# Patient Record
Sex: FEMALE | Race: WHITE | NOT HISPANIC OR LATINO | URBAN - METROPOLITAN AREA
[De-identification: names, ages, dates, MRNs, and addresses within clinical notes are randomized per-mention and may not be internally consistent; named-entity substitution may affect disease eponyms.]

---

## 2018-10-13 ENCOUNTER — INPATIENT (INPATIENT)
Facility: HOSPITAL | Age: 28
LOS: 0 days | Discharge: HOME | DRG: 833 | End: 2018-10-14
Attending: OBSTETRICS & GYNECOLOGY | Admitting: OBSTETRICS & GYNECOLOGY
Payer: COMMERCIAL

## 2018-10-13 VITALS
DIASTOLIC BLOOD PRESSURE: 74 MMHG | RESPIRATION RATE: 16 BRPM | HEART RATE: 82 BPM | SYSTOLIC BLOOD PRESSURE: 122 MMHG | TEMPERATURE: 98 F | OXYGEN SATURATION: 97 % | WEIGHT: 132.06 LBS

## 2018-10-13 LAB
ALBUMIN SERPL ELPH-MCNC: 4.7 G/DL — SIGNIFICANT CHANGE UP (ref 3.3–5)
ALP SERPL-CCNC: 39 U/L — LOW (ref 40–120)
ALT FLD-CCNC: 13 U/L — SIGNIFICANT CHANGE UP (ref 10–45)
ANION GAP SERPL CALC-SCNC: 14 MMOL/L — SIGNIFICANT CHANGE UP (ref 5–17)
APPEARANCE UR: CLEAR — SIGNIFICANT CHANGE UP
AST SERPL-CCNC: 17 U/L — SIGNIFICANT CHANGE UP (ref 10–40)
BASOPHILS NFR BLD AUTO: 0.3 % — SIGNIFICANT CHANGE UP (ref 0–2)
BILIRUB SERPL-MCNC: 0.5 MG/DL — SIGNIFICANT CHANGE UP (ref 0.2–1.2)
BILIRUB UR-MCNC: NEGATIVE — SIGNIFICANT CHANGE UP
BUN SERPL-MCNC: 8 MG/DL — SIGNIFICANT CHANGE UP (ref 7–23)
CALCIUM SERPL-MCNC: 9.8 MG/DL — SIGNIFICANT CHANGE UP (ref 8.4–10.5)
CHLORIDE SERPL-SCNC: 97 MMOL/L — SIGNIFICANT CHANGE UP (ref 96–108)
CO2 SERPL-SCNC: 25 MMOL/L — SIGNIFICANT CHANGE UP (ref 22–31)
COLOR SPEC: YELLOW — SIGNIFICANT CHANGE UP
CREAT SERPL-MCNC: 0.78 MG/DL — SIGNIFICANT CHANGE UP (ref 0.5–1.3)
DIFF PNL FLD: NEGATIVE — SIGNIFICANT CHANGE UP
EOSINOPHIL NFR BLD AUTO: 1 % — SIGNIFICANT CHANGE UP (ref 0–6)
GLUCOSE SERPL-MCNC: 86 MG/DL — SIGNIFICANT CHANGE UP (ref 70–99)
GLUCOSE UR QL: NEGATIVE — SIGNIFICANT CHANGE UP
HCT VFR BLD CALC: 37.9 % — SIGNIFICANT CHANGE UP (ref 34.5–45)
HGB BLD-MCNC: 13.6 G/DL — SIGNIFICANT CHANGE UP (ref 11.5–15.5)
KETONES UR-MCNC: 15 MG/DL
LEUKOCYTE ESTERASE UR-ACNC: ABNORMAL
LYMPHOCYTES # BLD AUTO: 19.6 % — SIGNIFICANT CHANGE UP (ref 13–44)
MCHC RBC-ENTMCNC: 30.4 PG — SIGNIFICANT CHANGE UP (ref 27–34)
MCHC RBC-ENTMCNC: 35.9 G/DL — SIGNIFICANT CHANGE UP (ref 32–36)
MCV RBC AUTO: 84.6 FL — SIGNIFICANT CHANGE UP (ref 80–100)
MONOCYTES NFR BLD AUTO: 7.9 % — SIGNIFICANT CHANGE UP (ref 2–14)
NEUTROPHILS NFR BLD AUTO: 71.2 % — SIGNIFICANT CHANGE UP (ref 43–77)
NITRITE UR-MCNC: NEGATIVE — SIGNIFICANT CHANGE UP
PH UR: 6 — SIGNIFICANT CHANGE UP (ref 5–8)
PLATELET # BLD AUTO: 205 K/UL — SIGNIFICANT CHANGE UP (ref 150–400)
POTASSIUM SERPL-MCNC: 3.7 MMOL/L — SIGNIFICANT CHANGE UP (ref 3.5–5.3)
POTASSIUM SERPL-SCNC: 3.7 MMOL/L — SIGNIFICANT CHANGE UP (ref 3.5–5.3)
PROT SERPL-MCNC: 6.9 G/DL — SIGNIFICANT CHANGE UP (ref 6–8.3)
PROT UR-MCNC: NEGATIVE MG/DL — SIGNIFICANT CHANGE UP
RBC # BLD: 4.48 M/UL — SIGNIFICANT CHANGE UP (ref 3.8–5.2)
RBC # FLD: 11.5 % — SIGNIFICANT CHANGE UP (ref 10.3–16.9)
SODIUM SERPL-SCNC: 136 MMOL/L — SIGNIFICANT CHANGE UP (ref 135–145)
SP GR SPEC: >=1.03 — SIGNIFICANT CHANGE UP (ref 1–1.03)
UROBILINOGEN FLD QL: 0.2 E.U./DL — SIGNIFICANT CHANGE UP
WBC # BLD: 6.9 K/UL — SIGNIFICANT CHANGE UP (ref 3.8–10.5)
WBC # FLD AUTO: 6.9 K/UL — SIGNIFICANT CHANGE UP (ref 3.8–10.5)

## 2018-10-13 PROCEDURE — 99285 EMERGENCY DEPT VISIT HI MDM: CPT

## 2018-10-13 RX ORDER — ONDANSETRON 8 MG/1
4 TABLET, FILM COATED ORAL ONCE
Qty: 0 | Refills: 0 | Status: COMPLETED | OUTPATIENT
Start: 2018-10-13 | End: 2018-10-13

## 2018-10-13 RX ORDER — METOCLOPRAMIDE HCL 10 MG
10 TABLET ORAL ONCE
Qty: 0 | Refills: 0 | Status: COMPLETED | OUTPATIENT
Start: 2018-10-13 | End: 2018-10-13

## 2018-10-13 RX ORDER — SODIUM CHLORIDE 9 MG/ML
1000 INJECTION, SOLUTION INTRAVENOUS
Qty: 0 | Refills: 0 | Status: DISCONTINUED | OUTPATIENT
Start: 2018-10-13 | End: 2018-10-13

## 2018-10-13 RX ORDER — ONDANSETRON 8 MG/1
8 TABLET, FILM COATED ORAL EVERY 12 HOURS
Qty: 0 | Refills: 0 | Status: DISCONTINUED | OUTPATIENT
Start: 2018-10-14 | End: 2018-10-14

## 2018-10-13 RX ORDER — FAMOTIDINE 10 MG/ML
20 INJECTION INTRAVENOUS ONCE
Qty: 0 | Refills: 0 | Status: COMPLETED | OUTPATIENT
Start: 2018-10-13 | End: 2018-10-13

## 2018-10-13 RX ORDER — INFLUENZA VIRUS VACCINE 15; 15; 15; 15 UG/.5ML; UG/.5ML; UG/.5ML; UG/.5ML
0.5 SUSPENSION INTRAMUSCULAR ONCE
Qty: 0 | Refills: 0 | Status: DISCONTINUED | OUTPATIENT
Start: 2018-10-13 | End: 2018-10-14

## 2018-10-13 RX ORDER — SODIUM CHLORIDE 9 MG/ML
1000 INJECTION INTRAMUSCULAR; INTRAVENOUS; SUBCUTANEOUS
Qty: 0 | Refills: 0 | Status: DISCONTINUED | OUTPATIENT
Start: 2018-10-13 | End: 2018-10-14

## 2018-10-13 RX ORDER — SIMETHICONE 80 MG/1
80 TABLET, CHEWABLE ORAL EVERY 8 HOURS
Qty: 0 | Refills: 0 | Status: DISCONTINUED | OUTPATIENT
Start: 2018-10-13 | End: 2018-10-14

## 2018-10-13 RX ORDER — ONDANSETRON 8 MG/1
8 TABLET, FILM COATED ORAL EVERY 12 HOURS
Qty: 0 | Refills: 0 | Status: DISCONTINUED | OUTPATIENT
Start: 2018-10-13 | End: 2018-10-13

## 2018-10-13 RX ORDER — SODIUM CHLORIDE 9 MG/ML
1000 INJECTION, SOLUTION INTRAVENOUS
Qty: 0 | Refills: 0 | Status: DISCONTINUED | OUTPATIENT
Start: 2018-10-13 | End: 2018-10-14

## 2018-10-13 RX ORDER — SODIUM CHLORIDE 9 MG/ML
1000 INJECTION INTRAMUSCULAR; INTRAVENOUS; SUBCUTANEOUS ONCE
Qty: 0 | Refills: 0 | Status: COMPLETED | OUTPATIENT
Start: 2018-10-13 | End: 2018-10-13

## 2018-10-13 RX ADMIN — SODIUM CHLORIDE 200 MILLILITER(S): 9 INJECTION, SOLUTION INTRAVENOUS at 21:53

## 2018-10-13 RX ADMIN — FAMOTIDINE 20 MILLIGRAM(S): 10 INJECTION INTRAVENOUS at 22:25

## 2018-10-13 RX ADMIN — SODIUM CHLORIDE 250 MILLILITER(S): 9 INJECTION, SOLUTION INTRAVENOUS at 12:42

## 2018-10-13 RX ADMIN — SODIUM CHLORIDE 125 MILLILITER(S): 9 INJECTION INTRAMUSCULAR; INTRAVENOUS; SUBCUTANEOUS at 15:05

## 2018-10-13 RX ADMIN — SODIUM CHLORIDE 1000 MILLILITER(S): 9 INJECTION INTRAMUSCULAR; INTRAVENOUS; SUBCUTANEOUS at 11:21

## 2018-10-13 RX ADMIN — SODIUM CHLORIDE 1000 MILLILITER(S): 9 INJECTION INTRAMUSCULAR; INTRAVENOUS; SUBCUTANEOUS at 12:42

## 2018-10-13 RX ADMIN — ONDANSETRON 4 MILLIGRAM(S): 8 TABLET, FILM COATED ORAL at 19:11

## 2018-10-13 RX ADMIN — Medication 12.5 MILLIGRAM(S): at 22:00

## 2018-10-13 RX ADMIN — Medication 10 MILLIGRAM(S): at 11:21

## 2018-10-13 NOTE — H&P ADULT - NSHPSOCIALHISTORY_GEN_ALL_CORE
Spoke with patient regarding results on Friday.  Informed her daughter, daughter would like to move appointment from 2/2/2017 to sooner.  Questioning if blockages found requires surgery or can it be treated with medications.  Informed I did not know, but Dr Simmons requested to see patient within 2 weeks due to findings. Daughter stated they are very concerned.  Offered appointments on 1/19 and 1/26, will speak with patient and call back to reschedule appointment.  During previous discussion with patient, she requested follow up be on 2/2/2017 due to chemo.     Patient works as a nurse; she lives with her  and dog Joaquim

## 2018-10-13 NOTE — CONSULT NOTE ADULT - SUBJECTIVE AND OBJECTIVE BOX
Patient evaluated at bedside.   Patient denies fever, chills, chest pain, SOB, abdominal pain, nausea, vomiting, vaginal bleeding      OBHx:   GYN Hx:  PMHx:   SHx:  Meds:   Allergies:     PHYSICAL EXAM:   Vital Signs Last 24 Hrs  T(C): 36.5 (13 Oct 2018 10:46), Max: 36.5 (13 Oct 2018 10:46)  T(F): 97.7 (13 Oct 2018 10:46), Max: 97.7 (13 Oct 2018 10:46)  HR: 82 (13 Oct 2018 10:46) (82 - 82)  BP: 122/74 (13 Oct 2018 10:46) (122/74 - 122/74)  BP(mean): --  RR: 16 (13 Oct 2018 10:46) (16 - 16)  SpO2: 97% (13 Oct 2018 10:46) (97% - 97%)    **************************  Constitutional: Alert & Oriented x3, No acute distress  Respiratory: Clear to ausculation bilaterally; no wheezing, rhonchi, or crackles  Cardiovascular: regular rate and rhythm, no murmurs, or gallops  Gastrointestinal: soft, non tender, positive bowel sounds, no rebound or guarding   Pelvic exam:   Extremities: no calf tenderness or swelling      LABS:                        13.6   6.9   )-----------( 205      ( 13 Oct 2018 11:29 )             37.9     10-13    136  |  97  |  8   ----------------------------<  86  3.7   |  25  |  0.78    Ca    9.8      13 Oct 2018 11:29    TPro  6.9  /  Alb  4.7  /  TBili  0.5  /  DBili  x   /  AST  17  /  ALT  13  /  AlkPhos  39<L>  10-13      Urinalysis Basic - ( 13 Oct 2018 11:29 )    Color: Yellow / Appearance: Clear / SG: >=1.030 / pH: x  Gluc: x / Ketone: 15 mg/dL  / Bili: Negative / Urobili: 0.2 E.U./dL   Blood: x / Protein: NEGATIVE mg/dL / Nitrite: NEGATIVE   Leuk Esterase: Moderate / RBC: < 5 /HPF / WBC > 10 /HPF   Sq Epi: x / Non Sq Epi: Many /HPF / Bacteria: Many /HPF          RADIOLOGY & ADDITIONAL STUDIES: 26 yo G1 at 7w2d (JONO 5/30/19 by dating US) presents for evaluation of vomiting and nausea. Patient has been followed since 4 weeks for hyperemesis gravidarum. Patient reports that she has not been able to keep any solid food down since Thursday. She reports persistent nausea and vomiting and last vomited this morning. She was evaluated in the ED at an outside hospital on Wednesday 10/10 for dehydration and given IV fluids. She endorses lightheadedness, dizziness and weakness. She denies vaginal bleeding, leaking of fluid, fever, chills, chest pain, SOB, and abdominal pain.     OBHx: Denies   GYN Hx: Denies  PMHx: Denies   SHx: ACL surgery, wisdom teeth extraction   Meds: Phenergan suppositories, zofran PO   Allergies: NKDA     PHYSICAL EXAM:   Vital Signs Last 24 Hrs  T(C): 36.5 (13 Oct 2018 10:46), Max: 36.5 (13 Oct 2018 10:46)  T(F): 97.7 (13 Oct 2018 10:46), Max: 97.7 (13 Oct 2018 10:46)  HR: 82 (13 Oct 2018 10:46) (82 - 82)  BP: 122/74 (13 Oct 2018 10:46) (122/74 - 122/74)  BP(mean): --  RR: 16 (13 Oct 2018 10:46) (16 - 16)  SpO2: 97% (13 Oct 2018 10:46) (97% - 97%)    **************************  Constitutional: Alert & Oriented x3, No acute distress  Gastrointestinal: soft, non tender, positive bowel sounds, no rebound or guarding   Extremities: no calf tenderness or swelling      LABS:                        13.6   6.9   )-----------( 205      ( 13 Oct 2018 11:29 )             37.9     10-13    136  |  97  |  8   ----------------------------<  86  3.7   |  25  |  0.78    Ca    9.8      13 Oct 2018 11:29    TPro  6.9  /  Alb  4.7  /  TBili  0.5  /  DBili  x   /  AST  17  /  ALT  13  /  AlkPhos  39<L>  10-13      Urinalysis Basic - ( 13 Oct 2018 11:29 )    Color: Yellow / Appearance: Clear / SG: >=1.030 / pH: x  Gluc: x / Ketone: 15 mg/dL  / Bili: Negative / Urobili: 0.2 E.U./dL   Blood: x / Protein: NEGATIVE mg/dL / Nitrite: NEGATIVE   Leuk Esterase: Moderate / RBC: < 5 /HPF / WBC > 10 /HPF   Sq Epi: x / Non Sq Epi: Many /HPF / Bacteria: Many /HPF          RADIOLOGY & ADDITIONAL STUDIES:

## 2018-10-13 NOTE — ED ADULT NURSE NOTE - NSIMPLEMENTINTERV_GEN_ALL_ED
Implemented All Universal Safety Interventions:  [place]ine to call system. Call bell, personal items and telephone within reach. Instruct patient to call for assistance. Room bathroom lighting operational. Non-slip footwear when patient is off stretcher. Physically safe environment: no spills, clutter or unnecessary equipment. Stretcher in lowest position, wheels locked, appropriate side rails in place.

## 2018-10-13 NOTE — H&P ADULT - NSHPLABSRESULTS_GEN_ALL_CORE
LABS:                        13.6   6.9   )-----------( 205      ( 13 Oct 2018 11:29 )             37.9     10-13    136  |  97  |  8   ----------------------------<  86  3.7   |  25  |  0.78    Ca    9.8      13 Oct 2018 11:29    TPro  6.9  /  Alb  4.7  /  TBili  0.5  /  DBili  x   /  AST  17  /  ALT  13  /  AlkPhos  39<L>  10-13      Urinalysis Basic - ( 13 Oct 2018 11:29 )    Color: Yellow / Appearance: Clear / SG: >=1.030 / pH: x  Gluc: x / Ketone: 15 mg/dL  / Bili: Negative / Urobili: 0.2 E.U./dL   Blood: x / Protein: NEGATIVE mg/dL / Nitrite: NEGATIVE   Leuk Esterase: Moderate / RBC: < 5 /HPF / WBC > 10 /HPF   Sq Epi: x / Non Sq Epi: Many /HPF / Bacteria: Many /HPF

## 2018-10-13 NOTE — H&P ADULT - NSHPPHYSICALEXAM_GEN_ALL_CORE
PHYSICAL EXAM:   Vital Signs Last 24 Hrs  T(C): 36.5 (13 Oct 2018 10:46), Max: 36.5 (13 Oct 2018 10:46)  T(F): 97.7 (13 Oct 2018 10:46), Max: 97.7 (13 Oct 2018 10:46)  HR: 82 (13 Oct 2018 10:46) (82 - 82)  BP: 122/74 (13 Oct 2018 10:46) (122/74 - 122/74)  BP(mean): --  RR: 16 (13 Oct 2018 10:46) (16 - 16)  SpO2: 97% (13 Oct 2018 10:46) (97% - 97%)    **************************  Constitutional: Alert & Oriented x3, No acute distress  Gastrointestinal: soft, non tender, positive bowel sounds, no rebound or guarding   Extremities: no calf tenderness or swelling

## 2018-10-13 NOTE — ED PROVIDER NOTE - MEDICAL DECISION MAKING DETAILS
26 y/o f  7 weeks pregnant with n/v; nontender abd, vss, no vag bleeding.  Will send labs, IV hydration and reglan.  Urine with ketones, will f/u electrolytes, reassess and discuss with GYN.

## 2018-10-13 NOTE — ED ADULT TRIAGE NOTE - CHIEF COMPLAINT QUOTE
c/o nausea, vomiting x 2 weeks.  7 wks pregnant ().  Dx w/ hyperemesis.  Denies abd pain, vaginal bleeding.

## 2018-10-13 NOTE — H&P ADULT - HISTORY OF PRESENT ILLNESS
28 yo G1 at 7w2d (JONO 5/30/19 by dating US) presents for evaluation of vomiting and nausea. Patient has been followed since 4 weeks for hyperemesis gravidarum. Patient reports that she has not been able to keep any solid food down since Thursday. She reports persistent nausea and vomiting and last vomited this morning. She was evaluated in the ED at an outside hospital on Wednesday 10/10 for dehydration and given IV fluids. She endorses lightheadedness, dizziness and weakness. She denies vaginal bleeding, leaking of fluid, fever, chills, chest pain, SOB, and abdominal pain.     OBHx: Denies   GYN Hx: PCOS with irregular periods   PMHx: Denies   SHx: ACL surgery, wisdom teeth extraction   Meds: Phenergan suppositories, zofran PO   Allergies: NKDA

## 2018-10-13 NOTE — H&P ADULT - ASSESSMENT
Assessment and Plan:   26 yo G1 at 7w2 with hyperemesis gravidarum presents for evaluation of persistent nausea and vomiting.   - Recommend IV fluids  - Iv anti-emetics PRN   - Continue phenergan suppositories  - Regular diet as tolerated   - Admit to antepartum service     Discussed with Dr. Shepherd

## 2018-10-13 NOTE — CONSULT NOTE ADULT - ASSESSMENT
Assessment and Plan:   28 yo G1 at 7w2 with hyperemesis gravidarum presents for evaluation of persistent nausea and vomiting.   - Recommend IV fluids  - Iv anti-emetics PRN   - Continue phenergan suppositories  - Regular diet as tolerated   - Admit to antepartum service     Discussed with Dr. Shepherd

## 2018-10-13 NOTE — ED PROVIDER NOTE - OBJECTIVE STATEMENT
28 y/o f  7 wks pregnant presents c/o n/v for the past few weeks, unable to tolerate PO.  Pt stating has been on diclegis followed by zofran and now phenergan suppositories without resolution of vomiting.  Pt stating was treated for UTI, took 2 days of abx, didn't take this morning as she was feeling so nauseous, having some urgency and burning.  Denies fever, chills, diarrhea, abd pain, vb, all other ROS negative.

## 2018-10-14 VITALS
SYSTOLIC BLOOD PRESSURE: 104 MMHG | TEMPERATURE: 97 F | DIASTOLIC BLOOD PRESSURE: 56 MMHG | RESPIRATION RATE: 14 BRPM | HEART RATE: 65 BPM | OXYGEN SATURATION: 100 %

## 2018-10-14 LAB
BLD GP AB SCN SERPL QL: NEGATIVE — SIGNIFICANT CHANGE UP
RH IG SCN BLD-IMP: NEGATIVE — SIGNIFICANT CHANGE UP

## 2018-10-14 PROCEDURE — 86850 RBC ANTIBODY SCREEN: CPT

## 2018-10-14 PROCEDURE — 86901 BLOOD TYPING SEROLOGIC RH(D): CPT

## 2018-10-14 PROCEDURE — 96361 HYDRATE IV INFUSION ADD-ON: CPT

## 2018-10-14 PROCEDURE — G0378: CPT

## 2018-10-14 PROCEDURE — 36415 COLL VENOUS BLD VENIPUNCTURE: CPT

## 2018-10-14 PROCEDURE — 85025 COMPLETE CBC W/AUTO DIFF WBC: CPT

## 2018-10-14 PROCEDURE — 87086 URINE CULTURE/COLONY COUNT: CPT

## 2018-10-14 PROCEDURE — 86900 BLOOD TYPING SEROLOGIC ABO: CPT

## 2018-10-14 PROCEDURE — 81001 URINALYSIS AUTO W/SCOPE: CPT

## 2018-10-14 PROCEDURE — 99285 EMERGENCY DEPT VISIT HI MDM: CPT | Mod: 25

## 2018-10-14 PROCEDURE — 80053 COMPREHEN METABOLIC PANEL: CPT

## 2018-10-14 PROCEDURE — 96374 THER/PROPH/DIAG INJ IV PUSH: CPT

## 2018-10-14 RX ORDER — ONDANSETRON 8 MG/1
4 TABLET, FILM COATED ORAL EVERY 8 HOURS
Qty: 0 | Refills: 0 | Status: DISCONTINUED | OUTPATIENT
Start: 2018-10-14 | End: 2018-10-14

## 2018-10-14 RX ORDER — METOCLOPRAMIDE HCL 10 MG
5 TABLET ORAL EVERY 6 HOURS
Qty: 0 | Refills: 0 | Status: DISCONTINUED | OUTPATIENT
Start: 2018-10-14 | End: 2018-10-14

## 2018-10-14 RX ADMIN — Medication 12.5 MILLIGRAM(S): at 11:16

## 2018-10-14 RX ADMIN — SODIUM CHLORIDE 200 MILLILITER(S): 9 INJECTION, SOLUTION INTRAVENOUS at 11:16

## 2018-10-14 RX ADMIN — ONDANSETRON 8 MILLIGRAM(S): 8 TABLET, FILM COATED ORAL at 07:00

## 2018-10-14 NOTE — DISCHARGE NOTE ADULT - PATIENT PORTAL LINK FT
You can access the [facility] Patient Portal, offered by [facility], by registering with the following website: [email or web address]

## 2018-10-14 NOTE — PROGRESS NOTE ADULT - SUBJECTIVE AND OBJECTIVE BOX
Patient evaluated at bedside. Patient reports no further episodes of vomiting since arriving to the hospital. She denies nausea at this time. She is voiding without issue. She denies vaginal bleeding, leaking of fluid, lightheadedness, shortness or breath or any other complaints. She does feel hungry and is looking forward to trying breakfast this morning.      Vital Signs Last 24 Hrs  T(C): 36.2 (14 Oct 2018 08:50), Max: 37.2 (13 Oct 2018 20:28)  T(F): 97.1 (14 Oct 2018 08:50), Max: 99 (13 Oct 2018 20:28)  HR: 65 (14 Oct 2018 08:50) (65 - 90)  BP: 104/56 (14 Oct 2018 08:50) (98/62 - 122/74)  BP(mean): --  ABP: --  ABP(mean): --  RR: 14 (14 Oct 2018 08:50) (14 - 18)  SpO2: 100% (14 Oct 2018 08:50) (97% - 100%)    PAST MEDICAL & SURGICAL HISTORY:  PCOS (polycystic ovarian syndrome)  No significant past surgical history    Physical Exam  Gen: Alert, NAD, resting comfortably with partner at bedside   Abdomen: Nontender, not distended, soft    MEDICATIONS  (STANDING):  influenza   Vaccine 0.5 milliLiter(s) IntraMuscular once  multivitamin/thiamine/folic acid in sodium chloride 0.9% 1000 milliLiter(s) (200 mL/Hr) IV Continuous <Continuous>  ondansetron Injectable 8 milliGRAM(s) IV Push every 12 hours  promethazine Suppository 12.5 milliGRAM(s) Rectal at bedtime  sodium chloride 0.9%. 1000 milliLiter(s) (125 mL/Hr) IV Continuous <Continuous>                            13.6   6.9   )-----------( 205      ( 13 Oct 2018 11:29 )             37.9     10-13    136  |  97  |  8   ----------------------------<  86  3.7   |  25  |  0.78    Ca    9.8      13 Oct 2018 11:29    TPro  6.9  /  Alb  4.7  /  TBili  0.5  /  DBili  x   /  AST  17  /  ALT  13  /  AlkPhos  39<L>  10-13      Radiology:    Consults:    Assessment:    Plan:

## 2018-10-14 NOTE — DISCHARGE NOTE ADULT - HOSPITAL COURSE
Patient was admitted for nausea and vomiting in first trimester. While in hospital medication regimen was adjusted to control nausea and vomiting was controlled. While in hospital she was able to tolerate food and liquids without nausea and vomiting. Patient is to continue at home the following regimen:  Diclegis-1 tablet in morning, 1 tablet in afternoon, two tablets at bedtime  Promethazine-12.5cm rectal suppository every 8hrs  Zofran-4mg every 6hrs as need for nausea   Should also continue with pepcid twice per day and tums for heartburn.   If nausea persists to call Dr. Shepherd office on 10/15 to have an order for Zofran pump. Patient was admitted for nausea and vomiting in first trimester. While in hospital medication regimen was adjusted to control nausea and vomiting was controlled. While in hospital she was able to tolerate food and liquids without nausea and vomiting. Patient is to continue at home the following regimen:  Diclegis-1 tablet in morning, 1 tablet in afternoon, two tablets at bedtime  Promethazine-12.5cm rectal suppository every 8hrs  Zofran-4mg every 6hrs as need for nausea   Should also continue with pepcid(20mg oral) twice per day and tums for heartburn.   If nausea persists to call Dr. Shepherd office on 10/15 to have an order for Zofran pump.

## 2018-10-14 NOTE — DISCHARGE NOTE ADULT - CARE PLAN
Principal Discharge DX:	Hyperemesis  Goal:	resolution nausea and vomiting, tolerate PO  Assessment and plan of treatment:	Patient was admitted for nausea and vomiting in first trimester. While in hospital medication regimen was adjusted to control nausea and vomiting was controlled. While in hospital she was able to tolerate food and liquids without nausea and vomiting. Patient is to continue at home the following regimen:  Diclegis-1 tablet in morning, 1 tablet in afternoon, two tablets at bedtime  Promethazine-12.5cm rectal suppository every 8hrs  Zofran-4mg every 6hrs as need for nausea   Should also continue with pepcid twice per day and tums for heartburn.   If nausea persists to call Dr. Shepherd office on 10/15 to have an order for Zofran pump.

## 2018-10-14 NOTE — PROGRESS NOTE ADULT - ASSESSMENT
Assessment and Plan: 28 yo G1 at 7w3 admitted with hyperemesis gravidarum.     Plan:  1. Vital signs stable, continue to monitor per protocol  2. Continue phenergan suppositories and IV Zofran ATC  3. IV fluids and 1 banana bag daily   4. Encourage regular diet as tolerated   5. Labs pending

## 2018-10-14 NOTE — PROGRESS NOTE ADULT - ATTENDING COMMENTS
Admitted for hyperemeiss s/p hydration. Tolerating PO. Plan to dc today. Patient has rx for meds. Will order zofran pump for home.

## 2018-10-14 NOTE — DISCHARGE NOTE ADULT - MEDICATION SUMMARY - MEDICATIONS TO TAKE
I will START or STAY ON the medications listed below when I get home from the hospital:    promethazine 25 mg rectal suppository  --  rectally   -- Indication: For Hyperemesis

## 2018-10-14 NOTE — DISCHARGE NOTE ADULT - CARE PROVIDER_API CALL
Elza Shepherd), Obstetrics and Gynecology  [facility] [place] [address]  Phone: [phone]  Fax: [phone]

## 2018-10-14 NOTE — DISCHARGE NOTE ADULT - PLAN OF CARE
resolution nausea and vomiting, tolerate PO Patient was admitted for nausea and vomiting in first trimester. While in hospital medication regimen was adjusted to control nausea and vomiting was controlled. While in hospital she was able to tolerate food and liquids without nausea and vomiting. Patient is to continue at home the following regimen:  Diclegis-1 tablet in morning, 1 tablet in afternoon, two tablets at bedtime  Promethazine-12.5cm rectal suppository every 8hrs  Zofran-4mg every 6hrs as need for nausea   Should also continue with pepcid twice per day and tums for heartburn.   If nausea persists to call Dr. Shepherd office on 10/15 to have an order for Zofran pump.

## 2018-10-15 LAB
-  GENTAMICIN: SIGNIFICANT CHANGE UP
-  PENICILLIN: SIGNIFICANT CHANGE UP
CULTURE RESULTS: SIGNIFICANT CHANGE UP
METHOD TYPE: SIGNIFICANT CHANGE UP
ORGANISM # SPEC MICROSCOPIC CNT: SIGNIFICANT CHANGE UP
ORGANISM # SPEC MICROSCOPIC CNT: SIGNIFICANT CHANGE UP
SPECIMEN SOURCE: SIGNIFICANT CHANGE UP

## 2018-10-17 DIAGNOSIS — R12 HEARTBURN: ICD-10-CM

## 2018-10-17 DIAGNOSIS — Z34.91 ENCOUNTER FOR SUPERVISION OF NORMAL PREGNANCY, UNSPECIFIED, FIRST TRIMESTER: ICD-10-CM

## 2018-10-17 DIAGNOSIS — Z3A.01 LESS THAN 8 WEEKS GESTATION OF PREGNANCY: ICD-10-CM

## 2018-10-17 DIAGNOSIS — Z98.890 OTHER SPECIFIED POSTPROCEDURAL STATES: ICD-10-CM

## 2018-10-17 DIAGNOSIS — O99.281 ENDOCRINE, NUTRITIONAL AND METABOLIC DISEASES COMPLICATING PREGNANCY, FIRST TRIMESTER: ICD-10-CM

## 2018-10-17 DIAGNOSIS — Z28.21 IMMUNIZATION NOT CARRIED OUT BECAUSE OF PATIENT REFUSAL: ICD-10-CM

## 2018-10-17 DIAGNOSIS — O99.89 OTHER SPECIFIED DISEASES AND CONDITIONS COMPLICATING PREGNANCY, CHILDBIRTH AND THE PUERPERIUM: ICD-10-CM

## 2018-10-17 DIAGNOSIS — O21.1 HYPEREMESIS GRAVIDARUM WITH METABOLIC DISTURBANCE: ICD-10-CM

## 2018-10-17 DIAGNOSIS — E28.2 POLYCYSTIC OVARIAN SYNDROME: ICD-10-CM

## 2019-02-23 ENCOUNTER — EMERGENCY (EMERGENCY)
Facility: HOSPITAL | Age: 29
LOS: 1 days | Discharge: HOME | End: 2019-02-23
Attending: EMERGENCY MEDICINE | Admitting: EMERGENCY MEDICINE
Payer: COMMERCIAL

## 2019-02-23 VITALS
DIASTOLIC BLOOD PRESSURE: 75 MMHG | HEART RATE: 76 BPM | SYSTOLIC BLOOD PRESSURE: 129 MMHG | HEIGHT: 71 IN | WEIGHT: 162.04 LBS | OXYGEN SATURATION: 99 % | TEMPERATURE: 98 F | RESPIRATION RATE: 18 BRPM

## 2019-02-23 VITALS
RESPIRATION RATE: 16 BRPM | OXYGEN SATURATION: 97 % | TEMPERATURE: 99 F | DIASTOLIC BLOOD PRESSURE: 65 MMHG | HEART RATE: 73 BPM | SYSTOLIC BLOOD PRESSURE: 103 MMHG

## 2019-02-23 PROBLEM — E28.2 POLYCYSTIC OVARIAN SYNDROME: Chronic | Status: ACTIVE | Noted: 2018-10-13

## 2019-02-23 PROCEDURE — 93971 EXTREMITY STUDY: CPT | Mod: 26,RT

## 2019-02-23 PROCEDURE — 99284 EMERGENCY DEPT VISIT MOD MDM: CPT

## 2019-02-23 PROCEDURE — 99284 EMERGENCY DEPT VISIT MOD MDM: CPT | Mod: 25

## 2019-02-23 PROCEDURE — 93971 EXTREMITY STUDY: CPT

## 2019-02-23 NOTE — ED ADULT TRIAGE NOTE - CHIEF COMPLAINT QUOTE
Patient 26 weeks pregnant c/o rt calf cramps /pain for 3 days yesterday got worse radiates to rt groin , was sent by OB GYN for evaluation .  .

## 2019-02-23 NOTE — ED ADULT NURSE NOTE - OBJECTIVE STATEMENT
28yr/female presents to ED with c/o right calf and right groin pain since 4pm yesterday. Patient reports she is 26 weeks pregnant. Patient denies any fall or injury. Denies any swelling of right lower extremity, chest pain or shortness of breath. Ambulating with steady gait. Upgraded to Dr. Jarquin. 28yr/female presents to ED with c/o right calf pain radiating to right groin x3 days, pain worsened since 4pm yesterday. Patient reports she is 26 weeks pregnant. Patient denies any fall or injury. Denies any swelling of right lower extremity, chest pain or shortness of breath. Denies any abdominal pain, vaginal bleeding or discharge. Ambulating with steady gait. Upgraded to Dr. Jarquin.

## 2019-02-23 NOTE — ED PROVIDER NOTE - PHYSICAL EXAMINATION
VITAL SIGNS: I have reviewed nursing notes and confirm.  CONSTITUTIONAL: Well-developed; well-nourished; in no acute distress.  SKIN: Skin is warm and dry, no acute rash.  HEAD: Normocephalic; atraumatic.  EYES:  EOM intact; conjunctiva and sclera clear.  ENT: No nasal discharge; airway clear.  NECK: Supple; Voluntary FROM  CARD: No rubs appreciated, Regular rate and rhythm.  RESP: No wheezes, no rales. No respiratory distress  ABD: Soft; gravid; no rebound or guarding  EXT: Normal ROM. No cyanosis or edema. No tenderness, no palpable cord  NEURO: Alert, oriented. Grossly unremarkable.  PSYCH: Cooperative, appropriate. VITAL SIGNS: I have reviewed nursing notes and confirm.  CONSTITUTIONAL: Well-developed; well-nourished; in no acute distress.  SKIN: Skin is warm and dry, no acute rash.  HEAD: Normocephalic; atraumatic.  EYES:  EOM intact; conjunctiva and sclera clear.  ENT: No nasal discharge; airway clear.  NECK: Supple; Voluntary FROM  CARD: No rubs appreciated, Regular rate and rhythm.  RESP: No wheezes, no rales. No respiratory distress  ABD: Soft; gravid; no rebound or guarding  EXT: Normal ROM. No cyanosis or edema. No tenderness, no palpable cord, +DP pulse, well perfused.   NEURO: Alert, oriented. Grossly unremarkable.  PSYCH: Cooperative, appropriate.

## 2019-02-23 NOTE — ED PROVIDER NOTE - NSFOLLOWUPINSTRUCTIONS_ED_ALL_ED_FT
Immediately return to the Emergency Department or call 911 for any high fever, trouble breathing, severe vomiting, worsening pain, or any other concerns.    You were evaluated for DVT (Deep vein thrombosis) or "blood clot" with ultrasound today. The workup for this was negative. Please keep all your appointments with your Obstetrician.

## 2019-02-23 NOTE — ED PROVIDER NOTE - OBJECTIVE STATEMENT
28F pmh GERD, no folic acid & pepcid p/w few days of RLE cramping discomfort radiating from calf to groin, now only in calf, no swelling, no color change. Tried home hydration w/o change. Worse w/ walking. No sob, syncope, cp, n/v, ha, weakness. No hx DVT 28F pmh GERD, no folic acid & pepcid p/w few days of RLE cramping discomfort radiating from calf to groin, now only in calf, no swelling, no color change. Tried home hydration w/o change. Worse w/ walking. No sob, syncope, cp, n/v, ha, weakness. No hx DVT. NO abd pain, no n/v, no vaginal discharge.

## 2019-02-23 NOTE — ED CLERICAL - NS ED CLERK NOTE PRE-ARRIVAL INFORMATION; ADDITIONAL PRE-ARRIVAL INFORMATION
26 y/o F, Robi Arnett; being sent by Dr. Vu ([phone]), pt is 26 wks sent for right groin pain, R/O DVT.

## 2019-02-23 NOTE — ED PROVIDER NOTE - NOTES
Discussed neg US, no e/o DVT on exam. She agreed no add'l labs/imaging required and will fo/u with her outpt. No indication for admission at this time. NO abd pain, no n/v, no vaginal discharge.

## 2019-02-23 NOTE — ED PROVIDER NOTE - CLINICAL SUMMARY MEDICAL DECISION MAKING FREE TEXT BOX
28F pmh GERD, no folic acid & pepcid p/w few days of RLE cramping discomfort radiating from calf to groin, now only in calf, no swelling, no color change. Tried home hydration w/o change. Worse w/ walking. No sob, syncope, cp, n/v, ha, weakness. No hx DVT. No acute findings on exam. Will eval for possible DVT, unlikely electrolyte abnormality as tolerating diet well without issue, unlikely dehydration, not consistent w/ arterial insufficiency. 28F pmh GERD, no folic acid & pepcid p/w few days of RLE cramping discomfort radiating from calf to groin, now only in calf, no swelling, no color change. Tried home hydration w/o change. Worse w/ walking. No sob, syncope, cp, n/v, ha, weakness. No hx DVT. No acute findings on exam. Will eval for possible DVT, unlikely electrolyte abnormality as tolerating diet well without issue, unlikely dehydration, not consistent w/ arterial insufficiency as is well perfused, SILT. US neg. Advised Dr. Vu, agreed w/ plan for DC. Feeling much improved, no acute life threatening illness. Pt seeking discharge.

## 2019-02-27 DIAGNOSIS — M79.661 PAIN IN RIGHT LOWER LEG: ICD-10-CM

## 2019-02-27 DIAGNOSIS — Z3A.26 26 WEEKS GESTATION OF PREGNANCY: ICD-10-CM

## 2019-02-27 DIAGNOSIS — Z79.899 OTHER LONG TERM (CURRENT) DRUG THERAPY: ICD-10-CM

## 2019-02-27 DIAGNOSIS — O99.89 OTHER SPECIFIED DISEASES AND CONDITIONS COMPLICATING PREGNANCY, CHILDBIRTH AND THE PUERPERIUM: ICD-10-CM

## 2023-05-17 NOTE — PATIENT PROFILE ADULT - CONTRAINDICATIONS & PRECAUTIONS (SELECT ALL THAT APPLY)
initiate/review safe skin-to-skin/initiate/review techniques for position and latch/review techniques to manage sore nipples/engorgement/initiate/review finger suck/initiate/review breast massage/compression/reviewed components of an effective feeding and at least 8 effective feedings per day required/reviewed importance of monitoring infant diapers, the breastfeeding log, and minimum output each day/reviewed risks of unnecessary formula supplementation/reviewed risks of artificial nipples/reviewed strategies to transition to breastfeeding only/reviewed benefits and recommendations for rooming in/reviewed feeding on demand/by cue at least 8 times a day/reviewed indications of inadequate milk transfer that would require supplementation none...
